# Patient Record
Sex: MALE | Race: BLACK OR AFRICAN AMERICAN | NOT HISPANIC OR LATINO | Employment: OTHER | URBAN - METROPOLITAN AREA
[De-identification: names, ages, dates, MRNs, and addresses within clinical notes are randomized per-mention and may not be internally consistent; named-entity substitution may affect disease eponyms.]

---

## 2023-03-14 DIAGNOSIS — E11.9 TYPE 2 DIABETES MELLITUS WITHOUT COMPLICATIONS (MULTI): ICD-10-CM

## 2023-03-14 RX ORDER — GLIPIZIDE 10 MG/1
TABLET, FILM COATED, EXTENDED RELEASE ORAL
Qty: 180 TABLET | Refills: 0 | Status: SHIPPED | OUTPATIENT
Start: 2023-03-14 | End: 2023-12-26 | Stop reason: SDUPTHER

## 2023-12-26 ENCOUNTER — OFFICE VISIT (OUTPATIENT)
Dept: PRIMARY CARE | Facility: CLINIC | Age: 53
End: 2023-12-26
Payer: COMMERCIAL

## 2023-12-26 VITALS
SYSTOLIC BLOOD PRESSURE: 113 MMHG | OXYGEN SATURATION: 96 % | HEART RATE: 61 BPM | BODY MASS INDEX: 24.63 KG/M2 | DIASTOLIC BLOOD PRESSURE: 72 MMHG | WEIGHT: 162 LBS

## 2023-12-26 DIAGNOSIS — Z12.11 SCREENING FOR COLON CANCER: ICD-10-CM

## 2023-12-26 DIAGNOSIS — R25.2 LEG CRAMPS: ICD-10-CM

## 2023-12-26 DIAGNOSIS — M25.512 CHRONIC LEFT SHOULDER PAIN: ICD-10-CM

## 2023-12-26 DIAGNOSIS — Z12.5 SCREENING FOR PROSTATE CANCER: ICD-10-CM

## 2023-12-26 DIAGNOSIS — E11.9 TYPE 2 DIABETES MELLITUS WITHOUT COMPLICATION, WITHOUT LONG-TERM CURRENT USE OF INSULIN (MULTI): ICD-10-CM

## 2023-12-26 DIAGNOSIS — Z00.00 ROUTINE GENERAL MEDICAL EXAMINATION AT A HEALTH CARE FACILITY: Primary | ICD-10-CM

## 2023-12-26 DIAGNOSIS — G89.29 CHRONIC LEFT SHOULDER PAIN: ICD-10-CM

## 2023-12-26 DIAGNOSIS — Z11.59 NEED FOR HEPATITIS C SCREENING TEST: ICD-10-CM

## 2023-12-26 DIAGNOSIS — H61.23 BILATERAL IMPACTED CERUMEN: ICD-10-CM

## 2023-12-26 PROBLEM — R05.9 COUGH: Status: RESOLVED | Noted: 2023-12-26 | Resolved: 2023-12-26

## 2023-12-26 PROBLEM — M54.42 LOW BACK PAIN WITH LEFT-SIDED SCIATICA: Status: ACTIVE | Noted: 2023-12-26

## 2023-12-26 PROBLEM — R53.83 FATIGUE: Status: ACTIVE | Noted: 2023-12-26

## 2023-12-26 PROBLEM — E55.9 VITAMIN D DEFICIENCY: Status: ACTIVE | Noted: 2023-12-26

## 2023-12-26 PROBLEM — M79.10 MYALGIA: Status: ACTIVE | Noted: 2023-12-26

## 2023-12-26 PROBLEM — N52.9 ERECTILE DYSFUNCTION: Status: ACTIVE | Noted: 2023-12-26

## 2023-12-26 PROBLEM — R42 VERTIGO: Status: ACTIVE | Noted: 2023-12-26

## 2023-12-26 PROBLEM — M48.061 SPINAL STENOSIS OF LUMBAR REGION WITHOUT NEUROGENIC CLAUDICATION: Status: ACTIVE | Noted: 2023-12-26

## 2023-12-26 PROBLEM — E78.5 HYPERLIPIDEMIA LDL GOAL <100: Status: ACTIVE | Noted: 2023-12-26

## 2023-12-26 PROBLEM — M79.18 MYOFASCIAL PAIN SYNDROME: Status: ACTIVE | Noted: 2023-12-26

## 2023-12-26 PROCEDURE — 1036F TOBACCO NON-USER: CPT | Performed by: PHYSICIAN ASSISTANT

## 2023-12-26 PROCEDURE — 3074F SYST BP LT 130 MM HG: CPT | Performed by: PHYSICIAN ASSISTANT

## 2023-12-26 PROCEDURE — 3078F DIAST BP <80 MM HG: CPT | Performed by: PHYSICIAN ASSISTANT

## 2023-12-26 PROCEDURE — 99214 OFFICE O/P EST MOD 30 MIN: CPT | Performed by: PHYSICIAN ASSISTANT

## 2023-12-26 PROCEDURE — 99396 PREV VISIT EST AGE 40-64: CPT | Performed by: PHYSICIAN ASSISTANT

## 2023-12-26 RX ORDER — GLIPIZIDE 10 MG/1
10 TABLET, FILM COATED, EXTENDED RELEASE ORAL 2 TIMES DAILY
Qty: 180 TABLET | Refills: 0 | Status: SHIPPED | OUTPATIENT
Start: 2023-12-26 | End: 2024-02-05

## 2023-12-26 NOTE — PROGRESS NOTES
Subjective   Ric Arciniega is a 53 y.o. male who presents for Annual Exam. Generally doing well. Currently c/o:    Leg cramps: Improve with incorporating more bananas into his diet. Cramps worsen, return when not eating    L shoulder pain: Chronic. Sharp style pain, intermittent. Difficulty with overhead motions, throwing motions, sleeping on the L shoulder. Admits to having previously fractured the L shoulder after wrestling with his brother in 2001. He did not have to have any surgery. If actively exacerbating the pain, it will ache for 10-15 sec then resolve to baseline.     T2DM: has been off of glipizide ER 10mg. Does not check BG levels at home, needs testing supplies. Hba1c was 12.1% 2/2022. Urine albumin due, ordered. Encouraged to schedule for diabetic foot and eye exams if not UTD    HLD: not on any medication. ASCVD 10 yr risk 8.9%    Health maintenance:  Immunizations:  -Flu: refused    -Pneumococcal: recommended, deferred   -Shingrix: recommended from pharmacy    -Tdap: UTD, last 5/2014  PSA  due - ordered 12/26/23   Colon CA screening due (none prior) - Ordered 12/26/2023   CT cardiac scoring due - Ordered 12/26/2023   Eye care: wears reading glasses.    Dental care: UTD, Needs extractions     Last CPE: 12/26/23 (medicaid)     12 point ROS reviewed and negative other than as stated in HPI    /72   Pulse 61   Wt 73.5 kg (162 lb)   SpO2 96%   BMI 24.63 kg/m²   Objective   Physical Exam  Vitals reviewed.   Constitutional:       General: He is not in acute distress.     Appearance: Normal appearance.   HENT:      Head: Normocephalic and atraumatic.      Right Ear: Tympanic membrane, ear canal and external ear normal. There is no impacted cerumen.      Left Ear: Tympanic membrane, ear canal and external ear normal. There is no impacted cerumen.      Nose: Nose normal. No congestion or rhinorrhea.      Mouth/Throat:      Mouth: Mucous membranes are moist.      Pharynx: Oropharynx is clear. No  oropharyngeal exudate or posterior oropharyngeal erythema.   Eyes:      General: No scleral icterus.        Right eye: No discharge.         Left eye: No discharge.      Extraocular Movements: Extraocular movements intact.      Conjunctiva/sclera: Conjunctivae normal.      Pupils: Pupils are equal, round, and reactive to light.   Cardiovascular:      Rate and Rhythm: Normal rate and regular rhythm.      Heart sounds: Normal heart sounds. No murmur heard.     No friction rub. No gallop.   Pulmonary:      Effort: Pulmonary effort is normal. No respiratory distress.      Breath sounds: Normal breath sounds. No stridor. No wheezing, rhonchi or rales.   Abdominal:      General: Bowel sounds are normal. There is no distension.      Palpations: Abdomen is soft. There is no mass.      Tenderness: There is no abdominal tenderness. There is no right CVA tenderness or left CVA tenderness.   Musculoskeletal:         General: Normal range of motion.      Cervical back: Normal range of motion and neck supple.      Right lower leg: No edema.      Left lower leg: No edema.   Skin:     General: Skin is warm and dry.      Findings: No rash.   Neurological:      General: No focal deficit present.      Mental Status: He is alert and oriented to person, place, and time. Mental status is at baseline.      Cranial Nerves: No cranial nerve deficit.      Gait: Gait normal.   Psychiatric:         Mood and Affect: Mood normal.         Behavior: Behavior normal.         Assessment/Plan   Problem List Items Addressed This Visit       Diabetes mellitus (CMS/[facility])     Begin retaking glipizide ER 10 mg.  Hemoglobin A1c and urine albumin ordered.  Prescription for freestyle dudley sensor and reader sent to pharmacy.  Begin checking BG levels regularly.  Encouraged to schedule for diabetic foot and eye exams if not up-to-date         Relevant Medications    glipiZIDE XL (Glucotrol XL) 10 mg 24 hr tablet    FreeStyle Dudley reader (FreeStyle Dudley 2  [place]) misc    FreeStyle Dudley sensor system (FreeStyle Dudley 2 Sensor) kit    Other Relevant Orders    Hemoglobin A1C    Albumin, urine, random    Referral to Ophthalmology    CT cardiac scoring wo IV contrast    Routine general medical examination at a health care facility - Primary     Discussed age-appropriate preventative health measures.  CPE labs ordered         Relevant Orders    CBC and Auto Differential    Comprehensive Metabolic Panel    Vitamin D 25-Hydroxy,Total (for eval of Vitamin D levels)    TSH with reflex to Free T4 if abnormal    Hemoglobin A1C    Lipid Panel    Screening for prostate cancer     PSA ordered         Relevant Orders    Prostate Specific Antigen, Screen    Need for hepatitis C screening test     One-time hepatitis C screening ordered         Relevant Orders    Hepatitis C Antibody    Screening for colon cancer     Colonoscopy ordered         Relevant Orders    Colonoscopy Screening; Average Risk Patient    Bilateral impacted cerumen     Begin using Debrox otic solution.  Can refer to ENT if no improvement         Relevant Medications    carbamide peroxide (Debrox) 6.5 % otic solution    Leg cramps     CMP and Mg levels ordered. Encouraged a tablespoon of mustard or tonic water before bedtime         Relevant Orders    Magnesium    Chronic left shoulder pain     RICE therapy encouraged.  Take Tylenol arthritis as needed for pain.  X-ray of left shoulder ordered.  Can refer to PT and/or Ortho if desired         Relevant Orders    XR shoulder left 2+ views        Follow up in 3-4 months or sooner as needed

## 2023-12-30 PROBLEM — H61.23 BILATERAL IMPACTED CERUMEN: Status: ACTIVE | Noted: 2023-12-30

## 2023-12-30 PROBLEM — Z00.00 ROUTINE GENERAL MEDICAL EXAMINATION AT A HEALTH CARE FACILITY: Status: ACTIVE | Noted: 2023-12-30

## 2023-12-30 PROBLEM — Z12.5 SCREENING FOR PROSTATE CANCER: Status: ACTIVE | Noted: 2023-12-30

## 2023-12-30 PROBLEM — Z11.59 NEED FOR HEPATITIS C SCREENING TEST: Status: ACTIVE | Noted: 2023-12-30

## 2023-12-30 PROBLEM — Z12.11 SCREENING FOR COLON CANCER: Status: ACTIVE | Noted: 2023-12-30

## 2023-12-30 RX ORDER — FLASH GLUCOSE SCANNING READER
EACH MISCELLANEOUS
Qty: 1 EACH | Refills: 0 | Status: SHIPPED | OUTPATIENT
Start: 2023-12-30 | End: 2024-02-05

## 2023-12-30 RX ORDER — FLASH GLUCOSE SENSOR
KIT MISCELLANEOUS
Qty: 2 EACH | Refills: 4 | Status: SHIPPED | OUTPATIENT
Start: 2023-12-30 | End: 2024-04-29 | Stop reason: ALTCHOICE

## 2023-12-31 PROBLEM — R25.2 LEG CRAMPS: Status: ACTIVE | Noted: 2023-12-31

## 2023-12-31 PROBLEM — G89.29 CHRONIC LEFT SHOULDER PAIN: Status: ACTIVE | Noted: 2023-12-31

## 2023-12-31 PROBLEM — M25.512 CHRONIC LEFT SHOULDER PAIN: Status: ACTIVE | Noted: 2023-12-31

## 2023-12-31 NOTE — ASSESSMENT & PLAN NOTE
Begin retaking glipizide ER 10 mg.  Hemoglobin A1c and urine albumin ordered.  Prescription for freestyle shayne sensor and reader sent to pharmacy.  Begin checking BG levels regularly.  Encouraged to schedule for diabetic foot and eye exams if not up-to-date

## 2023-12-31 NOTE — ASSESSMENT & PLAN NOTE
RICE therapy encouraged.  Take Tylenol arthritis as needed for pain.  X-ray of left shoulder ordered.  Can refer to PT and/or Ortho if desired

## 2024-02-03 DIAGNOSIS — E11.9 TYPE 2 DIABETES MELLITUS WITHOUT COMPLICATION, WITHOUT LONG-TERM CURRENT USE OF INSULIN (MULTI): ICD-10-CM

## 2024-02-05 RX ORDER — GLIPIZIDE 10 MG/1
10 TABLET, FILM COATED, EXTENDED RELEASE ORAL 2 TIMES DAILY
Qty: 180 TABLET | Refills: 0 | Status: SHIPPED | OUTPATIENT
Start: 2024-02-05 | End: 2024-05-29 | Stop reason: SDUPTHER

## 2024-02-05 RX ORDER — FLASH GLUCOSE SCANNING READER
EACH MISCELLANEOUS
Qty: 1 EACH | Refills: 0 | Status: SHIPPED | OUTPATIENT
Start: 2024-02-05 | End: 2024-04-29 | Stop reason: ALTCHOICE

## 2024-02-12 ENCOUNTER — TELEPHONE (OUTPATIENT)
Dept: SURGERY | Facility: CLINIC | Age: 54
End: 2024-02-12
Payer: COMMERCIAL

## 2024-02-12 NOTE — LETTER
Thank you for scheduling surgery with Dr. Andino. Below you will find your Surgery Itinerary to include dates, times, and locations for appointments involved with your procedure.    You will be scheduled for two (2) separate Pre Admission Testing appointments. The first being a Screening Appointment through a phone call to determine the necessity of an in-person Pre Admission Testing appointment. If deemed unnecessary, the in-person appointment will be cancelled.  A representative from the hospital will contact you directly to schedule any Pre Admission Testing appointments.     On the day before the scheduled surgery, please call the Same Day Surgery department between 2-4 pm for a time of arrival for the day of procedure.  [facility] [phone]    Nothing to eat or drink after midnight the night before surgery    Surgery with Dr. Andino at [facility] - [address] [place] [address] on Tuesday March 5th, 2024      *Please note, you may receive a call from our financial counselors if you have a financial liability greater than $250.       PLEASE FOLLOW THESE BOWEL PREP INSTRUCTIONS     In order to maximize the chance of seeing small polyps, it is important to clean your intestinal tract of all fecal material. In order to accomplish this, you must follow these instructions.    You last solid food should be two days prior to your surgery. This date is Sunday March 3rd, 2024 at 5:00p.m. After this meal you will only be able to take clear liquids. Broth, Jell-O, clear fruit juices, ginger ale, plain tea or coffee (No cream, milk, or sugar,) soda water and popsicles without fruit are the only allowed food. No red or purple colored liquids are allowed.    On the day before colonoscopy, Monday March 4th, 2024, you will be required to take the following bowel prep and medications.    Purchase Miralax 238gm bottle  Purchase 4 Dulcolax tablets  Purchase 1 soft gel tablet of  Simethicone 125mg (Gas-X or generic)  Purchase one 64oz bottle of Gatorade, Propel, or Powerade (NO red or purple)    DIRECTIONS    In the morning, divide into two 32 ounce pitchers: 64 ounces of sports drink and the entire bottle of Miralax powder and refrigerate  At 1:00p.m. take the 4 Dulcolax tablets and 1 tablet of Simethicone  At 4:00p.m.-start drinking the first pitcher of 32 ounces of Miralax prep, one large (8oz) glass every 10-15 minutes until gone. Drink rapidly, with a straw  Bowel movements should begin within one hour.  At 10:00p.m. drink the second pitcher of Miralax. Your stool should be clear.    You may continue to have clear liquids that evening, but you should have nothing by mouth after midnight the night before the procedure except for sips of water with your medications.     [facility]  Pre - Operative Instructions     Your time of arrival for surgery is available the day before surgery. *If the surgery is on a Monday, or the Tuesday following a Monday Holiday, please call Same Day Surgery the Friday before your surgery date.*  DO NOT EAT OR DRINK ANYTHING AFTER MIDNIGHT THE NIGHT BEFORE SURGERY. This includes any beverages (coffee, water, soda, etc.), hard candy, gum or mints. If this is not followed, surgery may be canceled. Please avoid eating a large meal the evening before surgery. Please do not DRINK ALCOHOL or SMOKE FOR 24 HOURS before surgery.   Insulin Instructions - Please do not take any short acting Insulin (Regular or NPH). Do not take any oral diabetic medication on the day of surgery. Long acting Insulins may be taken (Lantus). We will check your blood sugar and administer at the hospital the day of surgery. Patient who have Insulin pumps are to make NO adjustments.   Prescription Medications - You are encouraged to take prescription medications including heart, blood pressure, anti-seizure, anxiety, breathing medications (including inhalers) with exception to diabetic  medications prior to arriving at the hospital the day of surgery. You may take prescribed pain medications as needed. Please remember the dose and time taken so we may inform your Anesthesiologist.   Please bring the name, dosage, and frequency of your medications if you did not provide these on the day of Pre Admission Testing. You may bring the actual bottles if this would be easier for you.   Please bring your prescribed inhalers with you the morning of surgery.   Patients on Anti-platelet and Anticoagulant agents, please read the following:  ASA, NSAIDS stop 5 days prior to surgery  Coumadin stop 5 days prior to surgery unless bridging therapy is needed (metal valve replacement, cardiac stent placement) - if so please speak to your Cardiologist or prescribing physician.   Other anti-platelet and anticoagulant agents:  Plavix (Clopidogrel) stop 5 days prior to surgery  Brilinta (Ticagrelor) stop 5 days prior to surgery   Effient (Prasugrel) stop 7 days prior to surgery   Lovenox (Enoxaparin) stop 24 hours prior to surgery  Arixtra (Fondaparinux) stop 5 days prior to surgery  Xarelto (Rivaroxaban) stop 3 days prior to surgery  Pradaxa (Dabigatran) stop 5 days prior to surgery  Eliquis (Apixaban) stop 3 days prior to surgery   Savaysa (Endoxaban) stop days prior to surgery     Please stop all herbal medications 2 weeks prior to surgery   C-PAP Devices - If you have a C-PAP device at home, bring it with you on our day of surgery if your surgery requires you to stay overnight.   Please bring a copy of any Advanced Directives the day of surgery if you did not provide it at Pre Admission Testing. These documents are living love and durable power of  for healthcare.   Please notify your physician/surgeon if you develop a cold, sore throat, fever, flu symptoms, COVID symptoms or any changes in your physical conditions.   A shower or bath is preferred the evening before or the morning of surgery.   Remove jewelry  before admission to the hospital. It is no longer permitted to tape rings. We ask that you leave all valuables at home. Any items of value will be given to a family member or locked up by security.   If you have a body piercing g that you cannot remove, it is recommended that you have it removed professionally and have a plastic spacer inserted. There is a risk for surgical burns with jewelry left in place.   Remove or wear minimal makeup the day of surgery. You will be asked to remove glasses and contact lenses prior to surgery. Please bring a glass case and/or contact lens case with you. These items are not provided.   Dentures and partials are usually removed prior to surgery. A denture cup will be provided for you.   You may be asked to remove nail polish. Acrylic nails are now acceptable.   Wear loose comfortable clothing that you will be able to fit over bandages when you leave the hospitals (as appropriate for your surgery).  Patients that are under the age of 18 years must have a parent or guardian present the day of surgery.   Family members of significant others may stay with you on the Same Day Surgery unit. While you are in surgery, they may wait for you in the family waiting area. The physician will speak to the waiting family, if permitted by the patient, after surgery.   Changes or delays in the surgery schedule may occur due to emergencies. The hospital will notify you if this occurs. We apologize for any inconveniences this may present.   You must have a responsible  available to drive you home after surgery. You will not be permitted to drive yourself home after surgery if you have received any anesthesia or sedation during your procedure.   Please visit our website at [email or web address] for more information regarding [facility].    For questions about your Pre Admission Testing (PAT)  [facility] [phone]  [facility] [phone]    Thank you  for choosing [facility]!

## 2024-02-12 NOTE — TELEPHONE ENCOUNTER
Contacted patient to schedule colonoscopy with Dr. Andino at [facility]. Patient scheduled 03/05/2024. Procedure itinerary, bowel prep and pre op instructions available to patient via [employer] and mailed to patient (patient request). Patient verbalized understanding and denied having any other questions at this time.

## 2024-02-12 NOTE — TELEPHONE ENCOUNTER
----- Message from Arlene Garcia RN sent at 2/8/2024 10:35 AM EST -----  Please schedule pt for screening Colonoscopy with Dr. Andino at [facility].

## 2024-03-04 ENCOUNTER — TELEPHONE (OUTPATIENT)
Dept: SURGERY | Facility: CLINIC | Age: 54
End: 2024-03-04
Payer: COMMERCIAL

## 2024-03-04 NOTE — LETTER
March 4, 2024    Ric Arciniega  [address]  [place] [address]    Dear Mr. Arciniega:    Thank you for scheduling surgery with Dr. Andino. Below you will find your Surgery Itinerary to include dates, times, and locations for appointments involved with your procedure.    You may be scheduled for two (2) separate Pre Admission Testing appointments. The first being a Screening Appointment through a phone call to determine the necessity of an in-person Pre Admission Testing appointment. If deemed unnecessary, the in-person appointment will be cancelled. A representative from the hospital will contact you directly to schedule any needed Pre Admission Testing appointments.       On the day before the scheduled surgery, please call the Same Day Surgery department between 2-4 pm for a time of arrival for the day of procedure.  [facility] [phone]    Nothing to eat or drink after midnight the night before surgery    Colonoscopy with Dr. Andino at [facility] - [address], [place] [address] on Tuesday April 16th, 2024    *Please note, you may receive a call from our financial counselors if you have a financial liability greater than $250.     PLEASE FOLLOW BOWEL PREP INSTRUCTIONS BELOW    In order to maximize the chance of seeing small polyps, it is important to clean your intestinal tract of all fecal material. In order to accomplish this, you must follow these instructions.    You last solid food should be two days prior to your surgery. This date is Sunday April 14th, 2024 at 5:00p.m. After this meal you will only be able to take clear liquids. Broth, Jell-O, clear fruit juices, ginger ale, plain tea or coffee (No cream, milk, or sugar,) soda water and popsicles without fruit are the only allowed food. No red or purple colored liquids are allowed.    On the day before colonoscopy, Monday April 15th, 2024, you will be required to take the following bowel prep and  medications.    Purchase Miralax 238gm bottle  Purchase 4 Dulcolax tablets  Purchase 1 soft gel tablet of Simethicone 125mg (Gas-X or generic)  Purchase one 64oz bottle of Gatorade, Propel, or Powerade (NO red or purple)    DIRECTIONS    In the morning, divide into two 32 ounce pitchers: 64 ounces of sports drink and the entire bottle of Miralax powder and refrigerate  At 1:00p.m. take the 4 Dulcolax tablets and 1 tablet of Simethicone  At 4:00p.m.-start drinking the first pitcher of 32 ounces of Miralax prep, one large (8oz) glass every 10-15 minutes until gone. Drink rapidly, with a straw  Bowel movements should begin within one hour.  At 10:00p.m. drink the second pitcher of Miralax. Your stool should be clear.    You may continue to have clear liquids that evening, but you should have nothing by mouth after midnight the night before the procedure except for sips of water with your medications.       [facility]  Pre - Operative Instructions     Your time of arrival for surgery is available the day before surgery. *If the surgery is on a Monday, or the Tuesday following a Monday Holiday, please call Same Day Surgery the Friday before your surgery date.*  DO NOT EAT OR DRINK ANYTHING AFTER MIDNIGHT THE NIGHT BEFORE SURGERY. This includes any beverages (coffee, water, soda, etc.), hard candy, gum or mints. If this is not followed, surgery may be canceled. Please avoid eating a large meal the evening before surgery. Please do not DRINK ALCOHOL or SMOKE FOR 24 HOURS before surgery.   Insulin Instructions - Please do not take any short acting Insulin (Regular or NPH). Do not take any oral diabetic medication on the day of surgery. Long acting Insulins may be taken (Lantus). We will check your blood sugar and administer at the hospital the day of surgery. Patient who have Insulin pumps are to make NO adjustments.   Prescription Medications - You are encouraged to take prescription medications including heart,  blood pressure, anti-seizure, anxiety, breathing medications (including inhalers) with exception to diabetic medications prior to arriving at the hospital the day of surgery. You may take prescribed pain medications as needed. Please remember the dose and time taken so we may inform your Anesthesiologist.   Please bring the name, dosage, and frequency of your medications if you did not provide these on the day of Pre Admission Testing. You may bring the actual bottles if this would be easier for you.   Please bring your prescribed inhalers with you the morning of surgery.   Patients on Anti-platelet and Anticoagulant agents, please read the following:  ASA, NSAIDS stop 5 days prior to surgery  Coumadin stop 5 days prior to surgery unless bridging therapy is needed (metal valve replacement, cardiac stent placement) - if so please speak to your Cardiologist or prescribing physician.   Other anti-platelet and anticoagulant agents:  Plavix (Clopidogrel) stop 5 days prior to surgery  Brilinta (Ticagrelor) stop 5 days prior to surgery   Effient (Prasugrel) stop 7 days prior to surgery   Lovenox (Enoxaparin) stop 24 hours prior to surgery  Arixtra (Fondaparinux) stop 5 days prior to surgery  Xarelto (Rivaroxaban) stop 3 days prior to surgery  Pradaxa (Dabigatran) stop 5 days prior to surgery  Eliquis (Apixaban) stop 3 days prior to surgery   Savaysa (Endoxaban) stop days prior to surgery     Please stop all herbal medications 2 weeks prior to surgery   C-PAP Devices - If you have a C-PAP device at home, bring it with you on our day of surgery if your surgery requires you to stay overnight.   Please bring a copy of any Advanced Directives the day of surgery if you did not provide it at Pre Admission Testing. These documents are living love and durable power of  for healthcare.   Please notify your physician/surgeon if you develop a cold, sore throat, fever, flu symptoms, COVID symptoms or any changes in your  physical conditions.   A shower or bath is preferred the evening before or the morning of surgery.   Remove jewelry before admission to the hospital. It is no longer permitted to tape rings. We ask that you leave all valuables at home. Any items of value will be given to a family member or locked up by security.   If you have a body piercing g that you cannot remove, it is recommended that you have it removed professionally and have a plastic spacer inserted. There is a risk for surgical burns with jewelry left in place.   Remove or wear minimal makeup the day of surgery. You will be asked to remove glasses and contact lenses prior to surgery. Please bring a glass case and/or contact lens case with you. These items are not provided.   Dentures and partials are usually removed prior to surgery. A denture cup will be provided for you.   You may be asked to remove nail polish. Acrylic nails are now acceptable.   Wear loose comfortable clothing that you will be able to fit over bandages when you leave the hospitals (as appropriate for your surgery).  Patients that are under the age of 18 years must have a parent or guardian present the day of surgery.   Family members of significant others may stay with you on the Same Day Surgery unit. While you are in surgery, they may wait for you in the family waiting area. The physician will speak to the waiting family, if permitted by the patient, after surgery.   Changes or delays in the surgery schedule may occur due to emergencies. The hospital will notify you if this occurs. We apologize for any inconveniences this may present.   You must have a responsible  available to drive you home after surgery. You will not be permitted to drive yourself home after surgery if you have received any anesthesia or sedation during your procedure.   Please visit our website at [email or web address] for more information regarding [facility].    For questions about your Pre  Admission Testing (PAT)  [facility] [phone]  [facility] [phone]    Thank you for choosing [facility]!      If you have any questions or concerns, please don't hesitate to call. Office phone: [phone]    Sincerely,    Rekha Andino MD

## 2024-03-04 NOTE — TELEPHONE ENCOUNTER
Patient called to reschedule colonoscopy with Dr. Andino on 03/05/2024. Patient is now scheduled for 04/16/2024 at [place] with Dr. Andino. Updated procedure itinerary, bowel prep and pre op instructions available to patient via [employer] and mailed to patient (patient request). Patient verbalized understanding and denied having any other questions at this time.

## 2024-03-05 ENCOUNTER — APPOINTMENT (OUTPATIENT)
Dept: GASTROENTEROLOGY | Facility: HOSPITAL | Age: 54
End: 2024-03-05
Payer: COMMERCIAL

## 2024-03-27 ENCOUNTER — HOSPITAL ENCOUNTER (OUTPATIENT)
Dept: RADIOLOGY | Facility: CLINIC | Age: 54
Discharge: HOME | End: 2024-03-27
Payer: COMMERCIAL

## 2024-03-27 ENCOUNTER — LAB (OUTPATIENT)
Dept: LAB | Facility: LAB | Age: 54
End: 2024-03-27
Payer: COMMERCIAL

## 2024-03-27 DIAGNOSIS — E11.9 TYPE 2 DIABETES MELLITUS WITHOUT COMPLICATION, WITHOUT LONG-TERM CURRENT USE OF INSULIN (MULTI): ICD-10-CM

## 2024-03-27 DIAGNOSIS — Z12.5 SCREENING FOR PROSTATE CANCER: ICD-10-CM

## 2024-03-27 DIAGNOSIS — R25.2 LEG CRAMPS: ICD-10-CM

## 2024-03-27 DIAGNOSIS — Z00.00 ROUTINE GENERAL MEDICAL EXAMINATION AT A HEALTH CARE FACILITY: ICD-10-CM

## 2024-03-27 DIAGNOSIS — G89.29 CHRONIC LEFT SHOULDER PAIN: ICD-10-CM

## 2024-03-27 DIAGNOSIS — M25.512 CHRONIC LEFT SHOULDER PAIN: ICD-10-CM

## 2024-03-27 DIAGNOSIS — Z11.59 NEED FOR HEPATITIS C SCREENING TEST: ICD-10-CM

## 2024-03-27 LAB
25(OH)D3 SERPL-MCNC: 46 NG/ML (ref 30–100)
ALBUMIN SERPL BCP-MCNC: 4.3 G/DL (ref 3.4–5)
ALP SERPL-CCNC: 85 U/L (ref 33–120)
ALT SERPL W P-5'-P-CCNC: 14 U/L (ref 10–52)
ANION GAP SERPL CALC-SCNC: 11 MMOL/L (ref 10–20)
AST SERPL W P-5'-P-CCNC: 13 U/L (ref 9–39)
BASOPHILS # BLD AUTO: 0.05 X10*3/UL (ref 0–0.1)
BASOPHILS NFR BLD AUTO: 1.2 %
BILIRUB SERPL-MCNC: 0.5 MG/DL (ref 0–1.2)
BUN SERPL-MCNC: 19 MG/DL (ref 6–23)
CALCIUM SERPL-MCNC: 9.7 MG/DL (ref 8.6–10.6)
CHLORIDE SERPL-SCNC: 101 MMOL/L (ref 98–107)
CHOLEST SERPL-MCNC: 213 MG/DL (ref 0–199)
CHOLESTEROL/HDL RATIO: 4
CO2 SERPL-SCNC: 33 MMOL/L (ref 21–32)
CREAT SERPL-MCNC: 1.15 MG/DL (ref 0.5–1.3)
CREAT UR-MCNC: 175.7 MG/DL (ref 20–370)
EGFRCR SERPLBLD CKD-EPI 2021: 76 ML/MIN/1.73M*2
EOSINOPHIL # BLD AUTO: 0.15 X10*3/UL (ref 0–0.7)
EOSINOPHIL NFR BLD AUTO: 3.7 %
ERYTHROCYTE [DISTWIDTH] IN BLOOD BY AUTOMATED COUNT: 12.2 % (ref 11.5–14.5)
EST. AVERAGE GLUCOSE BLD GHB EST-MCNC: 329 MG/DL
GLUCOSE SERPL-MCNC: 282 MG/DL (ref 74–99)
HBA1C MFR BLD: 13.1 %
HCT VFR BLD AUTO: 45.9 % (ref 41–52)
HCV AB SER QL: NONREACTIVE
HDLC SERPL-MCNC: 53 MG/DL
HGB BLD-MCNC: 15.1 G/DL (ref 13.5–17.5)
IMM GRANULOCYTES # BLD AUTO: 0.03 X10*3/UL (ref 0–0.7)
IMM GRANULOCYTES NFR BLD AUTO: 0.7 % (ref 0–0.9)
LDLC SERPL CALC-MCNC: 147 MG/DL
LYMPHOCYTES # BLD AUTO: 1.8 X10*3/UL (ref 1.2–4.8)
LYMPHOCYTES NFR BLD AUTO: 44.1 %
MAGNESIUM SERPL-MCNC: 1.81 MG/DL (ref 1.6–2.4)
MCH RBC QN AUTO: 27.6 PG (ref 26–34)
MCHC RBC AUTO-ENTMCNC: 32.9 G/DL (ref 32–36)
MCV RBC AUTO: 84 FL (ref 80–100)
MICROALBUMIN UR-MCNC: 18.7 MG/L
MICROALBUMIN/CREAT UR: 10.6 UG/MG CREAT
MONOCYTES # BLD AUTO: 0.42 X10*3/UL (ref 0.1–1)
MONOCYTES NFR BLD AUTO: 10.3 %
NEUTROPHILS # BLD AUTO: 1.63 X10*3/UL (ref 1.2–7.7)
NEUTROPHILS NFR BLD AUTO: 40 %
NON HDL CHOLESTEROL: 160 MG/DL (ref 0–149)
NRBC BLD-RTO: 0 /100 WBCS (ref 0–0)
PLATELET # BLD AUTO: 235 X10*3/UL (ref 150–450)
POTASSIUM SERPL-SCNC: 4 MMOL/L (ref 3.5–5.3)
PROT SERPL-MCNC: 7.3 G/DL (ref 6.4–8.2)
PSA SERPL-MCNC: 1.35 NG/ML
RBC # BLD AUTO: 5.47 X10*6/UL (ref 4.5–5.9)
SODIUM SERPL-SCNC: 141 MMOL/L (ref 136–145)
TRIGL SERPL-MCNC: 65 MG/DL (ref 0–149)
TSH SERPL-ACNC: 1.48 MIU/L (ref 0.44–3.98)
VLDL: 13 MG/DL (ref 0–40)
WBC # BLD AUTO: 4.1 X10*3/UL (ref 4.4–11.3)

## 2024-03-27 PROCEDURE — 75571 CT HRT W/O DYE W/CA TEST: CPT

## 2024-03-27 PROCEDURE — 85025 COMPLETE CBC W/AUTO DIFF WBC: CPT

## 2024-03-27 PROCEDURE — 83036 HEMOGLOBIN GLYCOSYLATED A1C: CPT

## 2024-03-27 PROCEDURE — 80061 LIPID PANEL: CPT

## 2024-03-27 PROCEDURE — 82570 ASSAY OF URINE CREATININE: CPT

## 2024-03-27 PROCEDURE — 82043 UR ALBUMIN QUANTITATIVE: CPT

## 2024-03-27 PROCEDURE — 84443 ASSAY THYROID STIM HORMONE: CPT

## 2024-03-27 PROCEDURE — 86803 HEPATITIS C AB TEST: CPT

## 2024-03-27 PROCEDURE — 36415 COLL VENOUS BLD VENIPUNCTURE: CPT

## 2024-03-27 PROCEDURE — 73030 X-RAY EXAM OF SHOULDER: CPT | Mod: LEFT SIDE | Performed by: RADIOLOGY

## 2024-03-27 PROCEDURE — 80053 COMPREHEN METABOLIC PANEL: CPT

## 2024-03-27 PROCEDURE — 82306 VITAMIN D 25 HYDROXY: CPT

## 2024-03-27 PROCEDURE — 73030 X-RAY EXAM OF SHOULDER: CPT | Mod: LT

## 2024-03-27 PROCEDURE — 83735 ASSAY OF MAGNESIUM: CPT

## 2024-03-27 PROCEDURE — 84153 ASSAY OF PSA TOTAL: CPT

## 2024-04-08 NOTE — RESULT ENCOUNTER NOTE
Blood work results coming from previous primary care provider.  I have not seen this patient in the past.    Blood sugar significantly elevated at 288, corresponding A1c of 13, way above diabetic goal.  Patient needs to have appointment with also see endocrinology urgently to discuss treatment options.    , well above diabetic goal of 70, should be seen for treatment options.    Remaining labs unremarkable.

## 2024-04-09 ENCOUNTER — TELEPHONE (OUTPATIENT)
Dept: PRIMARY CARE | Facility: CLINIC | Age: 54
End: 2024-04-09
Payer: COMMERCIAL

## 2024-04-09 DIAGNOSIS — E11.9 TYPE 2 DIABETES MELLITUS WITHOUT COMPLICATION, WITHOUT LONG-TERM CURRENT USE OF INSULIN (MULTI): Primary | ICD-10-CM

## 2024-04-09 NOTE — TELEPHONE ENCOUNTER
----- Message from Christopher D'Amico, DO sent at 4/8/2024 10:02 AM EDT -----  Blood work results coming from previous primary care provider.  I have not seen this patient in the past.    Blood sugar significantly elevated at 288, corresponding A1c of 13, way above diabetic goal.  Patient needs to have appointment with also see endocrinology urgently to discuss treatment options.    , well above diabetic goal of 70, should be seen for treatment options.    Remaining labs unremarkable.

## 2024-04-09 NOTE — PROGRESS NOTES
Patient referred to [facility] pharmacist to start diabetic regimen before establishing care for which she is scheduled in May

## 2024-04-09 NOTE — TELEPHONE ENCOUNTER
Result Communication    Resulted Orders   CBC and Auto Differential   Result Value Ref Range    WBC 4.1 (L) 4.4 - 11.3 x10*3/uL    nRBC 0.0 0.0 - 0.0 /100 WBCs    RBC 5.47 4.50 - 5.90 x10*6/uL    Hemoglobin 15.1 13.5 - 17.5 g/dL    Hematocrit 45.9 41.0 - 52.0 %    MCV 84 80 - 100 fL    MCH 27.6 26.0 - 34.0 pg    MCHC 32.9 32.0 - 36.0 g/dL    RDW 12.2 11.5 - 14.5 %    Platelets 235 150 - 450 x10*3/uL    Neutrophils % 40.0 40.0 - 80.0 %    Immature Granulocytes %, Automated 0.7 0.0 - 0.9 %      Comment:      Immature Granulocyte Count (IG) includes promyelocytes, myelocytes and metamyelocytes but does not include bands. Percent differential counts (%) should be interpreted in the context of the absolute cell counts (cells/UL).    Lymphocytes % 44.1 13.0 - 44.0 %    Monocytes % 10.3 2.0 - 10.0 %    Eosinophils % 3.7 0.0 - 6.0 %    Basophils % 1.2 0.0 - 2.0 %    Neutrophils Absolute 1.63 1.20 - 7.70 x10*3/uL      Comment:      Percent differential counts (%) should be interpreted in the context of the absolute cell counts (cells/uL).    Immature Granulocytes Absolute, Automated 0.03 0.00 - 0.70 x10*3/uL    Lymphocytes Absolute 1.80 1.20 - 4.80 x10*3/uL    Monocytes Absolute 0.42 0.10 - 1.00 x10*3/uL    Eosinophils Absolute 0.15 0.00 - 0.70 x10*3/uL    Basophils Absolute 0.05 0.00 - 0.10 x10*3/uL   Comprehensive Metabolic Panel   Result Value Ref Range    Glucose 282 (H) 74 - 99 mg/dL    Sodium 141 136 - 145 mmol/L    Potassium 4.0 3.5 - 5.3 mmol/L    Chloride 101 98 - 107 mmol/L    Bicarbonate 33 (H) 21 - 32 mmol/L    Anion Gap 11 10 - 20 mmol/L    Urea Nitrogen 19 6 - 23 mg/dL    Creatinine 1.15 0.50 - 1.30 mg/dL    eGFR 76 >60 mL/min/1.73m*2      Comment:      Calculations of estimated GFR are performed using the 2021 CKD-EPI Study Refit equation without the race variable for the IDMS-Traceable creatinine methods.  https://jasn.asnjournals.org/content/early/2021/09/22/ASN.0250286967    Calcium 9.7 8.6 - 10.6 mg/dL     Albumin 4.3 3.4 - 5.0 g/dL    Alkaline Phosphatase 85 33 - 120 U/L    Total Protein 7.3 6.4 - 8.2 g/dL    AST 13 9 - 39 U/L    Bilirubin, Total 0.5 0.0 - 1.2 mg/dL    ALT 14 10 - 52 U/L      Comment:      Patients treated with Sulfasalazine may generate falsely decreased results for ALT.   Vitamin D 25-Hydroxy,Total (for eval of Vitamin D levels)   Result Value Ref Range    Vitamin D, 25-Hydroxy, Total 46 30 - 100 ng/mL    Narrative    Deficiency:         < 20   ng/ml  Insufficiency:      20-29  ng/ml  Sufficiency:         ng/ml  This assay accurately quantifies the sum of Vitamin D3, 25-Hydroxy and Vitamin D2,25-Hydroxy.   TSH with reflex to Free T4 if abnormal   Result Value Ref Range    Thyroid Stimulating Hormone 1.48 0.44 - 3.98 mIU/L    Narrative    TSH testing is performed using different testing methodology at [facility] than at other [facility]. Direct result comparisons should only be made within the same method.     Hemoglobin A1C   Result Value Ref Range    Hemoglobin A1C 13.1 (H) see below %    Estimated Average Glucose 329 Not Established mg/dL    Narrative    Diagnosis of Diabetes-Adults  Non-Diabetic: < or = 5.6%  Increased risk for developing diabetes: 5.7-6.4%  Diagnostic of diabetes: > or = 6.5%    Monitoring of Diabetes  Age (y)....................... Therapeutic Goal (%)  Adults: >18.........................<7.0  Pediatrics: 13-18...................<7.5  Pediatrics: 7-12....................<8.0  Pediatrics: 0-6..................... 7.5-8.5    American Diabetes Association. Diabetes Care 33(S1), Jan 2010       Lipid Panel   Result Value Ref Range    Cholesterol 213 (H) 0 - 199 mg/dL      Comment:            Age      Desirable   Borderline High   High     0-19 Y     0 - 169       170 - 199     >/= 200    20-24 Y     0 - 189       190 - 224     >/= 225         >24 Y     0 - 199       200 - 239     >/= 240   **All ranges are based on fasting samples. Specific   therapeutic  targets will vary based on patient-specific   cardiac risk.    Pediatric guidelines reference:Pediatrics 2011, 128(S5).Adult guidelines reference: NCEP ATPIII Guidelines,JAIDA 2001, 258:2486-97    Venipuncture immediately after or during the administration of Metamizole may lead to falsely low results. Testing should be performed immediately prior to Metamizole dosing.    HDL-Cholesterol 53.0 mg/dL      Comment:        Age       Very Low   Low     Normal    High    0-19 Y    < 35      < 40     40-45     ----  20-24 Y    ----     < 40      >45      ----        >24 Y      ----     < 40     40-60      >60      Cholesterol/HDL Ratio 4.0       Comment:        Ref Values  Desirable  < 3.4  High Risk  > 5.0    LDL Calculated 147 (H) <=99 mg/dL      Comment:                                  Near   Borderline      AGE      Desirable  Optimal    High     High     Very High     0-19 Y     0 - 109     ---    110-129   >/= 130     ----    20-24 Y     0 - 119     ---    120-159   >/= 160     ----      >24 Y     0 -  99   100-129  130-159   160-189     >/=190      VLDL 13 0 - 40 mg/dL    Triglycerides 65 0 - 149 mg/dL      Comment:         Age         Desirable   Borderline High   High     Very High   0 D-90 D    19 - 174         ----         ----        ----  91 D- 9 Y     0 -  74        75 -  99     >/= 100      ----    10-19 Y     0 -  89        90 - 129     >/= 130      ----    20-24 Y     0 - 114       115 - 149     >/= 150      ----         >24 Y     0 - 149       150 - 199    200- 499    >/= 500    Venipuncture immediately after or during the administration of Metamizole may lead to falsely low results. Testing should be performed immediately prior to Metamizole dosing.    Non HDL Cholesterol 160 (H) 0 - 149 mg/dL      Comment:            Age       Desirable   Borderline High   High     Very High     0-19 Y     0 - 119       120 - 144     >/= 145    >/= 160    20-24 Y     0 - 149       150 - 189     >/= 190      ----          >24 Y    30 mg/dL above LDL Cholesterol goal     Albumin, urine, random   Result Value Ref Range    Albumin, Urine Random 18.7 Not established mg/L    Creatinine, Urine Random 175.7 20.0 - 370.0 mg/dL    Albumin/Creatine Ratio 10.6 <30.0 ug/mg Creat   Hepatitis C Antibody   Result Value Ref Range    Hepatitis C AB Nonreactive Nonreactive      Comment:      Results from patients taking biotin supplements or receiving high-dose biotin therapy should be interpreted with caution due to possible interference with this test. Providers may contact their local laboratory for further information.   Prostate Specific Antigen, Screen   Result Value Ref Range    Prostate Specific Antigen,Screen 1.35 <=4.00 ng/mL    Narrative    The FDA requires that the method used for PSA assay be reported to the physician. Values obtained with different assay methods must not be used interchangeably. This test was performed at [facility] using Siemens [employer] PSA method, which is a sandwich immunoassay using chemiluminescence for quantitation. The assay is approved for measurement of prostate-specific antigen (PSA) in serum and may be used in conjunction with a digital rectal examination in men 50 years and older as an aid in the detection of prostate cancer. 5 Alpha-reductase inhibitors (e.g., Proscar, Finasteride, Avodart, Dutasteride, and Jessica) for the treatment of BPH have been shown to lower PSA levels by an average of 50% after 6 months of treatment.        Magnesium   Result Value Ref Range    Magnesium 1.81 1.60 - 2.40 mg/dL       10:44 AM      Results were not successfully communicated with the patient and they did not acknowledge their understanding.

## 2024-04-15 ENCOUNTER — TELEPHONE (OUTPATIENT)
Dept: SURGERY | Facility: CLINIC | Age: 54
End: 2024-04-15
Payer: COMMERCIAL

## 2024-04-15 NOTE — TELEPHONE ENCOUNTER
"Patient called to reschedule colonoscopy with Dr. Andino at [facility] on 04/16/24 due to patient \"not having time to complete bowel prep\". Patient is rescheduled for 06/18/24 (next avail) with Dr. Andino at [facility]. Updated procedure itinerary, bowel prep, and pre op instructions available to patient via [employer] and mailed to patient (patient aware). Patient verbalized understanding and denied having any other questions at this time.   "

## 2024-04-15 NOTE — LETTER
April 15, 2024    Ric Arciniega  [address]  [place] [address]      Dear Mr. Arciniega:    Thank you for scheduling with Dr. Andino. Below you will find your Procedure Itinerary to include dates, times, and locations for appointments involved with your procedure.    You may be scheduled for a Pre Admission Testing appointment. A representative from the hospital will contact you directly to schedule any Pre Admission Testing appointments needed.    On the day before the scheduled procedure, please call the Same Day Surgery department between 2-4 pm for a time of arrival for the day of procedure.  [facility] [phone]    Nothing to eat or drink after midnight the night before surgery    Procedure with Dr. Andino at [facility] - [address], [place] [address]   On Tuesday June 18th, 2024     *Please note, you may receive a call from our financial counselors if you have a financial liability greater than $250.         PLEASE FOLLOW BOWEL PREP INSTRUCTIONS BELOW    In order to maximize the chance of seeing small polyps, it is important to clean your intestinal tract of all fecal material. In order to accomplish this, you must follow these instructions.    You last solid food should be two days prior to your surgery. This date is Sunday June 16th, 2024 at 5:00p.m. After this meal you will only be able to take clear liquids. Broth, Jell-O, clear fruit juices, ginger ale, plain tea or coffee (No cream, milk, or sugar,) soda water and popsicles without fruit are the only allowed food. No red or purple colored liquids are allowed.    On the day before colonoscopy, Monday June 18th, 2024, you will be required to take the following bowel prep and medications.    Purchase Miralax 238gm bottle  Purchase 4 Dulcolax tablets  Purchase 1 soft gel tablet of Simethicone 125mg (Gas-X or generic)  Purchase one 64oz bottle of Gatorade, Propel, or Powerade (NO red or  purple)    DIRECTIONS    In the morning, divide into two 32 ounce pitchers: 64 ounces of sports drink and the entire bottle of Miralax powder and refrigerate  At 1:00p.m. take the 4 Dulcolax tablets and 1 tablet of Simethicone  At 4:00p.m.-start drinking the first pitcher of 32 ounces of Miralax prep, one large (8oz) glass every 10-15 minutes until gone. Drink rapidly, with a straw  Bowel movements should begin within one hour.  At 10:00p.m. drink the second pitcher of Miralax. Your stool should be clear.    You may continue to have clear liquids that evening, but you should have nothing by mouth after midnight the night before the procedure except for sips of water with your medications.       [facility]  Pre - Operative Instructions     Your time of arrival for surgery is available the day before surgery. *If the surgery is on a Monday, or the Tuesday following a Monday Holiday, please call Same Day Surgery the Friday before your surgery date.*  DO NOT EAT OR DRINK ANYTHING AFTER MIDNIGHT THE NIGHT BEFORE SURGERY. This includes any beverages (coffee, water, soda, etc.), hard candy, gum or mints. If this is not followed, surgery may be canceled. Please avoid eating a large meal the evening before surgery. Please do not DRINK ALCOHOL or SMOKE FOR 24 HOURS before surgery.   Insulin Instructions - Please do not take any short acting Insulin (Regular or NPH). Do not take any oral diabetic medication on the day of surgery. Long acting Insulins may be taken (Lantus). We will check your blood sugar and administer at the hospital the day of surgery. Patient who have Insulin pumps are to make NO adjustments.   Prescription Medications - You are encouraged to take prescription medications including heart, blood pressure, anti-seizure, anxiety, breathing medications (including inhalers) with exception to diabetic medications prior to arriving at the hospital the day of surgery. You may take prescribed pain  medications as needed. Please remember the dose and time taken so we may inform your Anesthesiologist.   Please bring the name, dosage, and frequency of your medications if you did not provide these on the day of Pre Admission Testing. You may bring the actual bottles if this would be easier for you.   Please bring your prescribed inhalers with you the morning of surgery.   Patients on Anti-platelet and Anticoagulant agents, please read the following:  ASA, NSAIDS stop 5 days prior to surgery  Coumadin stop 5 days prior to surgery unless bridging therapy is needed (metal valve replacement, cardiac stent placement) - if so please speak to your Cardiologist or prescribing physician.   Other anti-platelet and anticoagulant agents:  Plavix (Clopidogrel) stop 5 days prior to surgery  Brilinta (Ticagrelor) stop 5 days prior to surgery   Effient (Prasugrel) stop 7 days prior to surgery   Lovenox (Enoxaparin) stop 24 hours prior to surgery  Arixtra (Fondaparinux) stop 5 days prior to surgery  Xarelto (Rivaroxaban) stop 3 days prior to surgery  Pradaxa (Dabigatran) stop 5 days prior to surgery  Eliquis (Apixaban) stop 3 days prior to surgery   Savaysa (Endoxaban) stop days prior to surgery     Please stop all herbal medications 2 weeks prior to surgery   C-PAP Devices - If you have a C-PAP device at home, bring it with you on our day of surgery if your surgery requires you to stay overnight.   Please bring a copy of any Advanced Directives the day of surgery if you did not provide it at Pre Admission Testing. These documents are living love and durable power of  for healthcare.   Please notify your physician/surgeon if you develop a cold, sore throat, fever, flu symptoms, COVID symptoms or any changes in your physical conditions.   A shower or bath is preferred the evening before or the morning of surgery.   Remove jewelry before admission to the hospital. It is no longer permitted to tape rings. We ask that you  leave all valuables at home. Any items of value will be given to a family member or locked up by security.   If you have a body piercing g that you cannot remove, it is recommended that you have it removed professionally and have a plastic spacer inserted. There is a risk for surgical burns with jewelry left in place.   Remove or wear minimal makeup the day of surgery. You will be asked to remove glasses and contact lenses prior to surgery. Please bring a glass case and/or contact lens case with you. These items are not provided.   Dentures and partials are usually removed prior to surgery. A denture cup will be provided for you.   You may be asked to remove nail polish. Acrylic nails are now acceptable.   Wear loose comfortable clothing that you will be able to fit over bandages when you leave the hospitals (as appropriate for your surgery).  Patients that are under the age of 18 years must have a parent or guardian present the day of surgery.   Family members of significant others may stay with you on the Same Day Surgery unit. While you are in surgery, they may wait for you in the family waiting area. The physician will speak to the waiting family, if permitted by the patient, after surgery.   Changes or delays in the surgery schedule may occur due to emergencies. The hospital will notify you if this occurs. We apologize for any inconveniences this may present.   You must have a responsible  available to drive you home after surgery. You will not be permitted to drive yourself home after surgery if you have received any anesthesia or sedation during your procedure.   Please visit our website at [email or web address] for more information regarding [facility] services.    For questions about your Pre Admission Testing (PAT)  [facility] [phone]  [facility] [phone]    Thank you for choosing [facility]!      If you have any questions or concerns, please  don't hesitate to call. Office Phone: [phone]    Sincerely,      Rekha Andino MD

## 2024-04-16 ENCOUNTER — APPOINTMENT (OUTPATIENT)
Dept: GASTROENTEROLOGY | Facility: HOSPITAL | Age: 54
End: 2024-04-16
Payer: COMMERCIAL

## 2024-04-22 ENCOUNTER — APPOINTMENT (OUTPATIENT)
Dept: PRIMARY CARE | Facility: CLINIC | Age: 54
End: 2024-04-22
Payer: COMMERCIAL

## 2024-04-29 ENCOUNTER — TELEMEDICINE (OUTPATIENT)
Dept: PHARMACY | Facility: HOSPITAL | Age: 54
End: 2024-04-29
Payer: COMMERCIAL

## 2024-04-29 DIAGNOSIS — E11.69 TYPE 2 DIABETES MELLITUS WITH OTHER SPECIFIED COMPLICATION, WITHOUT LONG-TERM CURRENT USE OF INSULIN (MULTI): Primary | ICD-10-CM

## 2024-04-29 DIAGNOSIS — E11.9 TYPE 2 DIABETES MELLITUS WITHOUT COMPLICATION, WITHOUT LONG-TERM CURRENT USE OF INSULIN (MULTI): ICD-10-CM

## 2024-04-29 RX ORDER — BLOOD-GLUCOSE,RECEIVER,CONT
EACH MISCELLANEOUS
Qty: 1 EACH | Refills: 0 | Status: SHIPPED | OUTPATIENT
Start: 2024-04-29

## 2024-04-29 RX ORDER — BLOOD-GLUCOSE SENSOR
EACH MISCELLANEOUS
Qty: 2 EACH | Refills: 5 | Status: SHIPPED | OUTPATIENT
Start: 2024-04-29

## 2024-04-29 RX ORDER — METFORMIN HYDROCHLORIDE 500 MG/1
500 TABLET, EXTENDED RELEASE ORAL
Qty: 90 TABLET | Refills: 0 | Status: SHIPPED | OUTPATIENT
Start: 2024-04-29 | End: 2024-05-13 | Stop reason: ALTCHOICE

## 2024-04-29 NOTE — PROGRESS NOTES
Pharmacist Clinic: Diabetes Management  Ric Arciniega is a 53 y.o. male was referred to Clinical Pharmacy Team for diabetes management.     Referring Provider: Shu Mora PA-C     HISTORY OF PRESENT ILLNESS  Approximate Date of Diagnosis: diagnosed with prediabetes in 2002, diagnosed with diabetes in 2013  Known complications due to diabetes included chronic kidney disease  Patient reports he has had a hard time with controlling DM on his own with constantly changing doctors and having a hard time paying for healthy foods     Diet: patient restarted his diet after last A1c came back elevated  - 3 meals per day  - Breakfast: egg whites with peppers and oatmeal; coffee with stevia and sugar free creamer   - Lunch: salad with chicken   - Dinner: chicken, broccoli  - Drinks: water    LAB REVIEW   Glucose (mg/dL)   Date Value   03/27/2024 282 (H)   02/28/2022 316 (H)   12/22/2020 341 (H)     Hemoglobin A1C (%)   Date Value   03/27/2024 13.1 (H)   02/28/2022 12.1 (A)   12/22/2020 11.1   11/28/2017 10.9     Bicarbonate (mmol/L)   Date Value   03/27/2024 33 (H)   02/28/2022 29   12/22/2020 28     Urea Nitrogen (mg/dL)   Date Value   03/27/2024 19   02/28/2022 13   12/22/2020 21     Creatinine (mg/dL)   Date Value   03/27/2024 1.15   02/28/2022 1.19   12/22/2020 1.12     Lab Results   Component Value Date    HGBA1C 13.1 (H) 03/27/2024    HGBA1C 12.1 (A) 02/28/2022    HGBA1C 11.1 12/22/2020     Lab Results   Component Value Date    CHOL 213 (H) 03/27/2024    CHOL 136 02/28/2022    CHOL 240 (H) 12/22/2020     Lab Results   Component Value Date    HDL 53.0 03/27/2024    HDL 42.9 02/28/2022    HDL 51.2 12/22/2020     Lab Results   Component Value Date    LDLCALC 147 (H) 03/27/2024     Lab Results   Component Value Date    TRIG 65 03/27/2024    TRIG 69 02/28/2022    TRIG 111 12/22/2020       DIABETES ASSESSMENT    CURRENT PHARMACOTHERAPY  - glipizide XL 10 mg twice daily     SECONDARY PREVENTION  - Statin? No  - ACE-I/ARB?  No    HISTORICAL PHARMACOTHERAPY  - metformin: diarrhea  - januvia: stopped taking   - tradjenta: stopped taking   - trulicity: never took this medication     SMBG MEASUREMENTS: freestyle shayne    DISCUSSION:   Patients diabetes is uncontrolled with most recent A1c of 13.1% (goal < 7%)   Patient admits before getting this A1c checked, he was eating a lot of fast food and soda   Since his A1c came back, he has worked significantly on diet   Blood glucose readings:   Patient reports he thinks at times his freestyle shayne is inaccurate because he wont feel good   Discussed that when your body is used to having much higher numbers, you will not feel good at normal numbers, which is why your shayne will not alert you   Discussed starting a new medication, patient is denying doing injections at this point in time   Discussed trying metformin  mg one tab by mouth once daily   We will slowly make changes as he does not feel comfortable making too many changes at once   Counseled patient, answered all questions and concerns    RECOMMENDATIONS/PLAN  1. Patients diabetes is poorly controlled with most recent A1c of 13.1 % (goal < 7 %).   - Continue all meds under the continuation of care with the referring provider and clinical pharmacy team.  - Initiate metformin  mg once daily.     Clinical Pharmacist follow up: 2 weeks    Thank you,  Odalis Cutler, PharmD    Verbal consent to manage patient's drug therapy was obtained from the patient. They were informed they may decline to participate or withdraw from participation in pharmacy services at any time.

## 2024-05-13 ENCOUNTER — TELEMEDICINE (OUTPATIENT)
Dept: PHARMACY | Facility: HOSPITAL | Age: 54
End: 2024-05-13
Payer: COMMERCIAL

## 2024-05-13 DIAGNOSIS — E11.69 TYPE 2 DIABETES MELLITUS WITH OTHER SPECIFIED COMPLICATION, WITHOUT LONG-TERM CURRENT USE OF INSULIN (MULTI): ICD-10-CM

## 2024-05-13 NOTE — PROGRESS NOTES
Pharmacist Clinic: Diabetes Management  Ric Arciniega is a 53 y.o. male was referred to Clinical Pharmacy Team for diabetes management.     Referring Provider: Shu Mora PA-C     HISTORY OF PRESENT ILLNESS  Approximate Date of Diagnosis: diagnosed with prediabetes in 2002, diagnosed with diabetes in 2013  Known complications due to diabetes included chronic kidney disease  Patient reports he has had a hard time with controlling DM on his own with constantly changing doctors and having a hard time paying for healthy foods     Diet: patient restarted his diet after last A1c came back elevated  - 3 meals per day  - Breakfast: egg whites with peppers and oatmeal; coffee with stevia and sugar free creamer   - Lunch: salad with chicken   - Dinner: chicken, broccoli  - Drinks: water    LAB REVIEW   Glucose (mg/dL)   Date Value   03/27/2024 282 (H)   02/28/2022 316 (H)   12/22/2020 341 (H)     Hemoglobin A1C (%)   Date Value   03/27/2024 13.1 (H)   02/28/2022 12.1 (A)   12/22/2020 11.1   11/28/2017 10.9     Bicarbonate (mmol/L)   Date Value   03/27/2024 33 (H)   02/28/2022 29   12/22/2020 28     Urea Nitrogen (mg/dL)   Date Value   03/27/2024 19   02/28/2022 13   12/22/2020 21     Creatinine (mg/dL)   Date Value   03/27/2024 1.15   02/28/2022 1.19   12/22/2020 1.12     Lab Results   Component Value Date    HGBA1C 13.1 (H) 03/27/2024    HGBA1C 12.1 (A) 02/28/2022    HGBA1C 11.1 12/22/2020     Lab Results   Component Value Date    CHOL 213 (H) 03/27/2024    CHOL 136 02/28/2022    CHOL 240 (H) 12/22/2020     Lab Results   Component Value Date    HDL 53.0 03/27/2024    HDL 42.9 02/28/2022    HDL 51.2 12/22/2020     Lab Results   Component Value Date    LDLCALC 147 (H) 03/27/2024     Lab Results   Component Value Date    TRIG 65 03/27/2024    TRIG 69 02/28/2022    TRIG 111 12/22/2020       DIABETES ASSESSMENT    CURRENT PHARMACOTHERAPY  - glipizide XL 10 mg twice daily     SECONDARY PREVENTION  - Statin? No  - ACE-I/ARB?  No    HISTORICAL PHARMACOTHERAPY  - metformin: diarrhea  - metformin ER: patient refused to take as he read it can cause kidney damage   - januvia: stopped taking   - tradjenta: stopped taking   - trulicity: never took this medication     SMBG MEASUREMENTS: freestyle shayne  - patient reports     DISCUSSION:   Patients diabetes is uncontrolled with most recent A1c of 13.1% (goal < 7%)   Patient admits before getting this A1c checked, he was eating a lot of fast food and soda   Since his A1c came back, he has worked significantly on diet   Patient has not started metformin ER   He is refusing the medication at this time against my recommendation     RECOMMENDATIONS/PLAN  1. Patients diabetes is poorly controlled with most recent A1c of 13.1 % (goal < 7 %).   - Continue all meds under the continuation of care with the referring provider and clinical pharmacy team.  - Patient is refusing additional pharmacotherapy at this time, against my recommendation.     Clinical Pharmacist follow up: as needed     Thank you,  Odalis Cutler, PharmD    Verbal consent to manage patient's drug therapy was obtained from the patient. They were informed they may decline to participate or withdraw from participation in pharmacy services at any time.

## 2024-05-21 ENCOUNTER — APPOINTMENT (OUTPATIENT)
Dept: PRIMARY CARE | Facility: CLINIC | Age: 54
End: 2024-05-21
Payer: COMMERCIAL

## 2024-05-29 ENCOUNTER — OFFICE VISIT (OUTPATIENT)
Dept: PRIMARY CARE | Facility: CLINIC | Age: 54
End: 2024-05-29
Payer: COMMERCIAL

## 2024-05-29 VITALS
DIASTOLIC BLOOD PRESSURE: 68 MMHG | BODY MASS INDEX: 26.21 KG/M2 | WEIGHT: 167 LBS | HEIGHT: 67 IN | SYSTOLIC BLOOD PRESSURE: 126 MMHG

## 2024-05-29 DIAGNOSIS — E78.5 HYPERLIPIDEMIA LDL GOAL <100: ICD-10-CM

## 2024-05-29 DIAGNOSIS — E11.9 TYPE 2 DIABETES MELLITUS WITHOUT COMPLICATION, WITHOUT LONG-TERM CURRENT USE OF INSULIN (MULTI): ICD-10-CM

## 2024-05-29 DIAGNOSIS — R53.83 OTHER FATIGUE: ICD-10-CM

## 2024-05-29 PROCEDURE — 3050F LDL-C >= 130 MG/DL: CPT | Performed by: INTERNAL MEDICINE

## 2024-05-29 PROCEDURE — 3078F DIAST BP <80 MM HG: CPT | Performed by: INTERNAL MEDICINE

## 2024-05-29 PROCEDURE — 3074F SYST BP LT 130 MM HG: CPT | Performed by: INTERNAL MEDICINE

## 2024-05-29 PROCEDURE — 3046F HEMOGLOBIN A1C LEVEL >9.0%: CPT | Performed by: INTERNAL MEDICINE

## 2024-05-29 PROCEDURE — 99213 OFFICE O/P EST LOW 20 MIN: CPT | Performed by: INTERNAL MEDICINE

## 2024-05-29 PROCEDURE — 3061F NEG MICROALBUMINURIA REV: CPT | Performed by: INTERNAL MEDICINE

## 2024-05-29 RX ORDER — GLIPIZIDE 10 MG/1
10 TABLET, FILM COATED, EXTENDED RELEASE ORAL 2 TIMES DAILY
Qty: 180 TABLET | Refills: 0 | Status: SHIPPED | OUTPATIENT
Start: 2024-05-29

## 2024-05-30 NOTE — PROGRESS NOTES
"Subjective   Patient ID: Ric Arciniega is a 53 y.o. male who presents for Follow-up (various conditions) and multiple medical issues.    Ric Arciniega today came here for multiple medical issues.  Excruciating pain.  Not feeling good.  Polydipsia, polyuria.  He is taking only glipizide for diabetes.  He understands that his diabetes not under control at all.  He came here for follow-up on various conditions.    I have personally reviewed the patient's Past Medical History, Medications, Allergies, Social History, and Family History in the EMR.    Review of Systems   All other systems reviewed and are negative.    Objective   /68   Ht 1.702 m (5' 7\")   Wt 75.8 kg (167 lb)   BMI 26.16 kg/m²     Physical Exam  Vitals reviewed.   Cardiovascular:      Heart sounds: Normal heart sounds, S1 normal and S2 normal. No murmur heard.     No friction rub.   Pulmonary:      Effort: Pulmonary effort is normal.      Breath sounds: Normal breath sounds and air entry.   Abdominal:      Palpations: There is no hepatomegaly, splenomegaly or mass.   Musculoskeletal:      Right lower leg: No edema.      Left lower leg: No edema.   Lymphadenopathy:      Lower Body: No right inguinal adenopathy. No left inguinal adenopathy.   Neurological:      Cranial Nerves: Cranial nerves 2-12 are intact.      Sensory: No sensory deficit.      Motor: Motor function is intact.      Deep Tendon Reflexes: Reflexes are normal and symmetric.     LAB WORK:  Laboratory testing discussed.    Assessment/Plan   Problem List Items Addressed This Visit             ICD-10-CM       Cardiac and Vasculature    Hyperlipidemia LDL goal <100 E78.5    Relevant Orders    Comprehensive Metabolic Panel    Albumin, 24 Hour Urine       Endocrine/Metabolic    Diabetes mellitus (Multi) E11.9    Relevant Medications    glipiZIDE XL (Glucotrol XL) 10 mg 24 hr tablet    Other Relevant Orders    Hemoglobin A1C       Symptoms and Signs    Fatigue R53.83    Relevant Orders "    Albumin , Urine Random   1. Type 2 diabetes, not under good control.  Hemoglobin A1c ordered.  I advised him to see diabetologist because hemoglobin A1c is totally not good.  I told him it is dangerously high.  2. Cholesterol.  Monitor.  3. I shall see him in a week after test.    Scribe Attestation  By signing my name below, I, Emiliana Kessler, Nela attest that this documentation has been prepared under the direction and in the presence of Alexis Funez MD.

## 2024-06-17 ENCOUNTER — ANESTHESIA EVENT (OUTPATIENT)
Dept: GASTROENTEROLOGY | Facility: HOSPITAL | Age: 54
End: 2024-06-17
Payer: COMMERCIAL

## 2024-06-17 RX ORDER — ONDANSETRON HYDROCHLORIDE 2 MG/ML
4 INJECTION, SOLUTION INTRAVENOUS ONCE AS NEEDED
Status: CANCELLED | OUTPATIENT
Start: 2024-06-17

## 2024-06-18 ENCOUNTER — ANESTHESIA (OUTPATIENT)
Dept: GASTROENTEROLOGY | Facility: HOSPITAL | Age: 54
End: 2024-06-18
Payer: COMMERCIAL

## 2024-06-18 ENCOUNTER — HOSPITAL ENCOUNTER (OUTPATIENT)
Dept: GASTROENTEROLOGY | Facility: HOSPITAL | Age: 54
Discharge: HOME | End: 2024-06-18
Payer: COMMERCIAL

## 2024-06-18 VITALS
SYSTOLIC BLOOD PRESSURE: 146 MMHG | HEIGHT: 67 IN | HEART RATE: 54 BPM | BODY MASS INDEX: 25.95 KG/M2 | OXYGEN SATURATION: 100 % | WEIGHT: 165.34 LBS | TEMPERATURE: 97.2 F | DIASTOLIC BLOOD PRESSURE: 69 MMHG | RESPIRATION RATE: 16 BRPM

## 2024-06-18 DIAGNOSIS — Z12.11 SCREENING FOR COLON CANCER: ICD-10-CM

## 2024-06-18 DIAGNOSIS — D12.2 ADENOMATOUS POLYP OF ASCENDING COLON: Primary | ICD-10-CM

## 2024-06-18 LAB — GLUCOSE BLD MANUAL STRIP-MCNC: 107 MG/DL (ref 74–99)

## 2024-06-18 PROCEDURE — 7100000002 HC RECOVERY ROOM TIME - EACH INCREMENTAL 1 MINUTE

## 2024-06-18 PROCEDURE — 88305 TISSUE EXAM BY PATHOLOGIST: CPT | Mod: TC | Performed by: STUDENT IN AN ORGANIZED HEALTH CARE EDUCATION/TRAINING PROGRAM

## 2024-06-18 PROCEDURE — 45380 COLONOSCOPY AND BIOPSY: CPT | Performed by: STUDENT IN AN ORGANIZED HEALTH CARE EDUCATION/TRAINING PROGRAM

## 2024-06-18 PROCEDURE — 2500000005 HC RX 250 GENERAL PHARMACY W/O HCPCS

## 2024-06-18 PROCEDURE — 2500000004 HC RX 250 GENERAL PHARMACY W/ HCPCS (ALT 636 FOR OP/ED)

## 2024-06-18 PROCEDURE — 82947 ASSAY GLUCOSE BLOOD QUANT: CPT

## 2024-06-18 PROCEDURE — 2500000004 HC RX 250 GENERAL PHARMACY W/ HCPCS (ALT 636 FOR OP/ED): Performed by: STUDENT IN AN ORGANIZED HEALTH CARE EDUCATION/TRAINING PROGRAM

## 2024-06-18 PROCEDURE — 3700000002 HC GENERAL ANESTHESIA TIME - EACH INCREMENTAL 1 MINUTE

## 2024-06-18 PROCEDURE — 7100000001 HC RECOVERY ROOM TIME - INITIAL BASE CHARGE

## 2024-06-18 PROCEDURE — 3700000001 HC GENERAL ANESTHESIA TIME - INITIAL BASE CHARGE

## 2024-06-18 PROCEDURE — 7100000010 HC PHASE TWO TIME - EACH INCREMENTAL 1 MINUTE

## 2024-06-18 PROCEDURE — 7100000009 HC PHASE TWO TIME - INITIAL BASE CHARGE

## 2024-06-18 RX ORDER — PROPOFOL 10 MG/ML
INJECTION, EMULSION INTRAVENOUS AS NEEDED
Status: DISCONTINUED | OUTPATIENT
Start: 2024-06-18 | End: 2024-06-18

## 2024-06-18 RX ORDER — SODIUM CHLORIDE, SODIUM LACTATE, POTASSIUM CHLORIDE, CALCIUM CHLORIDE 600; 310; 30; 20 MG/100ML; MG/100ML; MG/100ML; MG/100ML
100 INJECTION, SOLUTION INTRAVENOUS CONTINUOUS
Status: DISCONTINUED | OUTPATIENT
Start: 2024-06-18 | End: 2024-06-19 | Stop reason: HOSPADM

## 2024-06-18 RX ORDER — LIDOCAINE HYDROCHLORIDE 10 MG/ML
0.1 INJECTION INFILTRATION; PERINEURAL ONCE
Status: DISCONTINUED | OUTPATIENT
Start: 2024-06-18 | End: 2024-06-19 | Stop reason: HOSPADM

## 2024-06-18 RX ORDER — SODIUM CHLORIDE, SODIUM LACTATE, POTASSIUM CHLORIDE, CALCIUM CHLORIDE 600; 310; 30; 20 MG/100ML; MG/100ML; MG/100ML; MG/100ML
20 INJECTION, SOLUTION INTRAVENOUS CONTINUOUS
Status: DISCONTINUED | OUTPATIENT
Start: 2024-06-18 | End: 2024-06-19 | Stop reason: HOSPADM

## 2024-06-18 RX ORDER — ONDANSETRON HYDROCHLORIDE 2 MG/ML
INJECTION, SOLUTION INTRAVENOUS AS NEEDED
Status: DISCONTINUED | OUTPATIENT
Start: 2024-06-18 | End: 2024-06-18

## 2024-06-18 RX ORDER — PHENYLEPHRINE HYDROCHLORIDE 10 MG/ML
INJECTION INTRAVENOUS AS NEEDED
Status: DISCONTINUED | OUTPATIENT
Start: 2024-06-18 | End: 2024-06-18

## 2024-06-18 RX ORDER — LIDOCAINE HYDROCHLORIDE 10 MG/ML
INJECTION INFILTRATION; PERINEURAL AS NEEDED
Status: DISCONTINUED | OUTPATIENT
Start: 2024-06-18 | End: 2024-06-18

## 2024-06-18 SDOH — HEALTH STABILITY: MENTAL HEALTH: CURRENT SMOKER: 0

## 2024-06-18 ASSESSMENT — PAIN SCALES - GENERAL
PAINLEVEL_OUTOF10: 0 - NO PAIN
PAIN_LEVEL: 0
PAINLEVEL_OUTOF10: 0 - NO PAIN

## 2024-06-18 ASSESSMENT — ENCOUNTER SYMPTOMS
OCCASIONAL FEELINGS OF UNSTEADINESS: 0
LOSS OF SENSATION IN FEET: 0
DEPRESSION: 0

## 2024-06-18 ASSESSMENT — PAIN - FUNCTIONAL ASSESSMENT
PAIN_FUNCTIONAL_ASSESSMENT: 0-10

## 2024-06-18 NOTE — ANESTHESIA POSTPROCEDURE EVALUATION
Patient: Ric Arciniega    Procedure Summary       Date: 06/18/24 Room / Location: [facility]    Anesthesia Start: 1237 Anesthesia Stop: 1327    Procedure: COLONOSCOPY Diagnosis:       Screening for colon cancer      Adenomatous polyp of ascending colon    Scheduled Providers: Rekha Andino MD; Kobi Zarco MD; YOLIS Velez-CRNA Responsible Provider: Kobi Zarco MD    Anesthesia Type: MAC ASA Status: 3            Anesthesia Type: MAC    Vitals Value Taken Time   /77 06/18/24 1326   Temp 36 °C (96.8 °F) 06/18/24 1326   Pulse 56 06/18/24 1326   Resp 16 06/18/24 1326   SpO2 100 % 06/18/24 1326       Anesthesia Post Evaluation    Patient location during evaluation: PACU  Patient participation: complete - patient participated  Level of consciousness: awake  Pain score: 0  Pain management: adequate  Multimodal analgesia pain management approach  Airway patency: patent  Two or more strategies used to mitigate risk of obstructive sleep apnea  Cardiovascular status: acceptable  Respiratory status: acceptable  Hydration status: acceptable  Postoperative Nausea and Vomiting: none  Comments: No Nausea        There were no known notable events for this encounter.

## 2024-06-18 NOTE — DISCHARGE INSTRUCTIONS
Instructions  Patient Instructions after a Colonoscopy, Upper GI, and ERCP      The anesthetics, sedatives or narcotics which were given to you today will be acting in your body for the next 24 hours, so you might feel a little sleepy or groggy.  This feeling should slowly wear off. Carefully read and follow the instructions.      You received sedation today:  - Do not drive or operate any machinery or power tools of any kind.   - No alcoholic beverages today, not even beer or wine.  - Do not make any important decisions or sign any legal documents.  - No over the counter medications that contain alcohol or that may cause drowsiness.  - Do not make any important decisions or sign any legal documents.     While it is common to experience mild to moderate abdominal distention, gas, or belching after your procedure, if any of these symptoms occur following discharge from the GI Lab or within one week of having your procedure, call the [facility] to be advised whether a visit to your nearest Urgent Care or Emergency Department is indicated.  Take this paper with you if you go.      - If you develop an allergic reaction to the medications that were given during your procedure such as difficulty breathing, rash, hives, severe nausea, vomiting or lightheadedness.- If you experience chest pain, shortness of breath, severe abdominal pain, fevers and chills.     -If you develop signs and symptoms of bleeding such as blood in your spit, if your stools turn black, tarry, or bloody     - If you have not urinated within 8 hours following your procedure.- If your IV site becomes painful, red, inflamed, or looks infected.     If you received a biopsy/polypectomy/sphincterotomy the following instructions apply below:     - Do not use Aspirin containing products, non-steroidal medications or anti-coagulants for one week following your procedure. (Examples of these types of medications are: Advil, Arthrotec, Aleve,  Coumadin, Ecotrin, Heparin, Ibuprofen, Indocin, Motrin, Naprosyn, Nuprin, Plavix, Vioxx, and Voltarin, or their generic forms.  This list is not all-inclusive.  Check with your physician or pharmacist before resuming medications.)      - Eat a soft diet today.  Avoid foods that are poorly digested for the next 24 hours.  These foods would include: nuts, beans, lettuce, red meats, and fried foods.       - Start with liquids and advance your diet as tolerated, gradually work up to eating solids.      - Do not have a Barium Study or Enema for one week.     Your physician recommends the additional following instructions:     Colonoscopy: Resume your previous diet, continue your present medications, a repeated colonoscopy will be determined based on pathology result. Return to normal activity tomorrow.      Upper GI endoscopy: Resume your previous diet, continue your medications, recommendation to repeat upper endoscopy in three months for follow up of Leonard's ablation. Return to normal activity tomorrow.      ERCP: Recommended a repeat ERCP in eight weeks to exchange a stent. Watch for symptoms of pancreatitis, bleeding, perforation and cholangitis.  Please see Medication Reconciliation Form for new medication/medications prescribed.

## 2024-06-18 NOTE — ANESTHESIA PREPROCEDURE EVALUATION
"Patient: Ric Arciniega    Procedure Information       Date/Time: 06/18/24 1245    Scheduled providers: Rekha Andino MD; Kobi Zarco MD; KEO Velez    Procedure: COLONOSCOPY    Location: [facility]            Visit Vitals  /70   Pulse 66   Temp 36 °C (96.8 °F) (Temporal)   Resp 18   Ht 1.7 m (5' 6.93\")   Wt 75 kg (165 lb 5.5 oz)   SpO2 98%   BMI 25.95 kg/m²   Smoking Status Never   BSA 1.88 m²        Current Outpatient Medications   Medication Instructions    blood-glucose meter,continuous (FreeStyle Dudley 3 Logan) misc Use as instructed to check blood glucose    blood-glucose sensor (FreeStyle Dudley 3 Sensor) device Use as instructed to check blood glucose throughout the day    glipiZIDE XL (GLUCOTROL XL) 10 mg, oral, 2 times daily, Do not crush, chew, or split.        No Known Allergies     Past Surgical History:   Procedure Laterality Date    OTHER SURGICAL HISTORY  09/12/2014    Repair Of Forearm Extensor Tendon        Relevant Problems   Cardiac   (+) Chest pain   (+) Hyperlipidemia LDL goal <100      Neuro   (+) Low back pain with left-sided sciatica      Musculoskeletal   (+) Spinal stenosis of lumbar region without neurogenic claudication       Active Ambulatory Problems     Diagnosis Date Noted    Diabetes mellitus (Multi) 12/26/2023    Erectile dysfunction 12/26/2023    Fatigue 12/26/2023    Hyperlipidemia LDL goal <100 12/26/2023    Low back pain with left-sided sciatica 12/26/2023    Myalgia 12/26/2023    Myofascial pain syndrome 12/26/2023    Spinal stenosis of lumbar region without neurogenic claudication 12/26/2023    Vertigo 12/26/2023    Vitamin D deficiency 12/26/2023    Chest pain 04/02/2013    Closed fracture of lateral malleolus 05/17/2014    Closed fracture of metatarsal bone 05/17/2014    Cuboid fracture 05/31/2014    Varicose veins of lower extremities with inflammation 03/12/2004    Venous (peripheral) insufficiency 03/12/2004    Routine general " medical examination at a health care facility 12/30/2023    Screening for prostate cancer 12/30/2023    Need for hepatitis C screening test 12/30/2023    Screening for colon cancer 12/30/2023    Bilateral impacted cerumen 12/30/2023    Leg cramps 12/31/2023    Chronic left shoulder pain 12/31/2023     Resolved Ambulatory Problems     Diagnosis Date Noted    Cough 12/26/2023    Impacted cerumen of both ears 12/26/2023     Past Medical History:   Diagnosis Date    Acute atopic conjunctivitis, bilateral 08/04/2020    Dorsalgia, unspecified 08/04/2020    Other abnormal findings in urine 08/04/2020    Pain in right knee 08/04/2020    Personal history of other infectious and parasitic diseases 01/21/2016    Personal history of other infectious and parasitic diseases     Personal history of other specified conditions 08/04/2020    Personal history of other specified conditions 08/04/2020    Unspecified sprain of left wrist, initial encounter 08/04/2020       Clinical information reviewed:   Tobacco  Allergies  Meds   Med Hx  Surg Hx   Fam Hx  Soc Hx        NPO Detail:    NPO/Void Status  Carbohydrate Drink Given Prior to Surgery? : N  Date of Last Liquid: 06/17/24  Time of Last Liquid: 1900  Date of Last Solid: 06/16/24  Time of Last Solid: 2100  Time of Last Void: 1100         Physical Exam    Airway  Mallampati: II  TM distance: >3 FB  Neck ROM: full     Cardiovascular - normal exam     Dental - normal exam     Pulmonary - normal exam     Abdominal            Anesthesia Plan    History of general anesthesia?: yes  History of complications of general anesthesia?: no    ASA 3     MAC     The patient is not a current smoker.    intravenous induction   Anesthetic plan and risks discussed with patient.    Plan discussed with CRNA.

## 2024-06-19 LAB
LABORATORY COMMENT REPORT: NORMAL
PATH REPORT.FINAL DX SPEC: NORMAL
PATH REPORT.GROSS SPEC: NORMAL
PATH REPORT.RELEVANT HX SPEC: NORMAL
PATH REPORT.TOTAL CANCER: NORMAL

## 2024-06-25 ENCOUNTER — TELEPHONE (OUTPATIENT)
Dept: PRIMARY CARE | Facility: CLINIC | Age: 54
End: 2024-06-25
Payer: COMMERCIAL

## 2024-07-03 ENCOUNTER — APPOINTMENT (OUTPATIENT)
Dept: PRIMARY CARE | Facility: CLINIC | Age: 54
End: 2024-07-03
Payer: COMMERCIAL

## 2024-07-03 PROBLEM — H61.20 IMPACTED CERUMEN: Status: ACTIVE | Noted: 2024-07-03

## 2024-07-03 PROBLEM — Z86.19 HISTORY OF SYPHILIS: Status: ACTIVE | Noted: 2024-07-03

## 2024-07-08 ENCOUNTER — APPOINTMENT (OUTPATIENT)
Dept: PRIMARY CARE | Facility: CLINIC | Age: 54
End: 2024-07-08
Payer: COMMERCIAL

## 2024-07-08 VITALS
SYSTOLIC BLOOD PRESSURE: 118 MMHG | WEIGHT: 161 LBS | HEIGHT: 66 IN | BODY MASS INDEX: 25.88 KG/M2 | DIASTOLIC BLOOD PRESSURE: 62 MMHG

## 2024-07-08 DIAGNOSIS — E78.5 HYPERLIPIDEMIA LDL GOAL <100: ICD-10-CM

## 2024-07-08 DIAGNOSIS — Z13.29 THYROID DISORDER SCREENING: ICD-10-CM

## 2024-07-08 DIAGNOSIS — R53.83 OTHER FATIGUE: ICD-10-CM

## 2024-07-08 DIAGNOSIS — E11.9 TYPE 2 DIABETES MELLITUS WITHOUT COMPLICATION, WITHOUT LONG-TERM CURRENT USE OF INSULIN (MULTI): ICD-10-CM

## 2024-07-08 DIAGNOSIS — Z98.890 STATUS POST COLONOSCOPY: Primary | ICD-10-CM

## 2024-07-08 PROCEDURE — 3050F LDL-C >= 130 MG/DL: CPT | Performed by: INTERNAL MEDICINE

## 2024-07-08 PROCEDURE — 99213 OFFICE O/P EST LOW 20 MIN: CPT | Performed by: INTERNAL MEDICINE

## 2024-07-08 PROCEDURE — 3061F NEG MICROALBUMINURIA REV: CPT | Performed by: INTERNAL MEDICINE

## 2024-07-08 PROCEDURE — 3074F SYST BP LT 130 MM HG: CPT | Performed by: INTERNAL MEDICINE

## 2024-07-08 PROCEDURE — 3046F HEMOGLOBIN A1C LEVEL >9.0%: CPT | Performed by: INTERNAL MEDICINE

## 2024-07-08 PROCEDURE — 3078F DIAST BP <80 MM HG: CPT | Performed by: INTERNAL MEDICINE

## 2024-07-08 RX ORDER — GLIPIZIDE 10 MG/1
10 TABLET, FILM COATED, EXTENDED RELEASE ORAL 2 TIMES DAILY
Qty: 180 TABLET | Refills: 0 | Status: SHIPPED | OUTPATIENT
Start: 2024-07-08

## 2024-07-08 ASSESSMENT — ENCOUNTER SYMPTOMS
OCCASIONAL FEELINGS OF UNSTEADINESS: 0
LOSS OF SENSATION IN FEET: 0
DEPRESSION: 0

## 2024-07-09 NOTE — PROGRESS NOTES
"Subjective   Patient ID: Ric Arciniega is a 53 y.o. male who presents for Follow-up.    This gentleman today came here.  He had colonoscopy done.  Appetite and weight are okay.  No problem.  He came for follow-up on blood work.    I have personally reviewed the patient's Past Medical History, Medications, Allergies, Social History, and Family History in the EMR.    Review of Systems   All other systems reviewed and are negative.    Objective   /62   Ht 1.676 m (5' 6\")   Wt 73 kg (161 lb)   BMI 25.99 kg/m²     Physical Exam  Vitals reviewed.   Cardiovascular:      Heart sounds: Normal heart sounds, S1 normal and S2 normal. No murmur heard.     No friction rub.   Pulmonary:      Effort: Pulmonary effort is normal.      Breath sounds: Normal breath sounds and air entry.   Abdominal:      Palpations: There is no hepatomegaly, splenomegaly or mass.   Musculoskeletal:      Right lower leg: No edema.      Left lower leg: No edema.   Lymphadenopathy:      Lower Body: No right inguinal adenopathy. No left inguinal adenopathy.   Neurological:      Cranial Nerves: Cranial nerves 2-12 are intact.      Sensory: No sensory deficit.      Motor: Motor function is intact.      Deep Tendon Reflexes: Reflexes are normal and symmetric.     LAB WORK: Laboratory testing discussed.    Assessment/Plan   Problem List Items Addressed This Visit             ICD-10-CM       Cardiac and Vasculature    Hyperlipidemia LDL goal <100 E78.5    Relevant Orders    Comprehensive Metabolic Panel    Lipid Panel       Endocrine/Metabolic    Diabetes mellitus (Multi) E11.9    Relevant Medications    glipiZIDE XL (Glucotrol XL) 10 mg 24 hr tablet    Other Relevant Orders    Hemoglobin A1C       Symptoms and Signs    Fatigue R53.83    Relevant Orders    CBC    Urinalysis with Reflex Microscopic    Thyroid Stimulating Hormone     Other Visit Diagnoses         Codes    Status post colonoscopy    -  Primary Z98.890    Thyroid disorder screening     " Z13.29        1. Status post colonoscopy, polyp.  ______ did the colonoscopy.  Advised him to ______.  2. Type 2 diabetes.  Monitor hemoglobin A1c.  3. Thyroid, okay.  4. Blood work ordered.  5. I shall see him back in three months.    Scribe Attestation  By signing my name below, I, Nela So attest that this documentation has been prepared under the direction and in the presence of Alexis Funez MD.

## 2024-10-08 ENCOUNTER — APPOINTMENT (OUTPATIENT)
Dept: PRIMARY CARE | Facility: CLINIC | Age: 54
End: 2024-10-08
Payer: COMMERCIAL

## 2024-10-14 ENCOUNTER — APPOINTMENT (OUTPATIENT)
Dept: PRIMARY CARE | Facility: CLINIC | Age: 54
End: 2024-10-14
Payer: COMMERCIAL

## 2024-10-17 ENCOUNTER — APPOINTMENT (OUTPATIENT)
Dept: PRIMARY CARE | Facility: CLINIC | Age: 54
End: 2024-10-17
Payer: COMMERCIAL